# Patient Record
Sex: FEMALE | Race: WHITE | NOT HISPANIC OR LATINO
[De-identification: names, ages, dates, MRNs, and addresses within clinical notes are randomized per-mention and may not be internally consistent; named-entity substitution may affect disease eponyms.]

---

## 2017-11-27 ENCOUNTER — TRANSCRIPTION ENCOUNTER (OUTPATIENT)
Age: 32
End: 2017-11-27

## 2022-12-20 PROBLEM — Z00.00 ENCOUNTER FOR PREVENTIVE HEALTH EXAMINATION: Status: ACTIVE | Noted: 2022-12-20

## 2023-01-24 ENCOUNTER — APPOINTMENT (OUTPATIENT)
Dept: PLASTIC SURGERY | Facility: CLINIC | Age: 38
End: 2023-01-24
Payer: SELF-PAY

## 2023-01-24 VITALS — WEIGHT: 121.25 LBS | HEIGHT: 65.75 IN | BODY MASS INDEX: 19.72 KG/M2

## 2023-01-24 DIAGNOSIS — Z41.1 ENCOUNTER FOR COSMETIC SURGERY: ICD-10-CM

## 2023-01-24 DIAGNOSIS — Z78.9 OTHER SPECIFIED HEALTH STATUS: ICD-10-CM

## 2023-01-24 DIAGNOSIS — Z86.2 PERSONAL HISTORY OF DISEASES OF THE BLOOD AND BLOOD-FORMING ORGANS AND CERTAIN DISORDERS INVOLVING THE IMMUNE MECHANISM: ICD-10-CM

## 2023-01-24 PROCEDURE — 99499 UNLISTED E&M SERVICE: CPT

## 2023-01-24 NOTE — PHYSICAL EXAM
[de-identified] : SN-N: L 20 cm R 19 cm BD: L 12.5 cm R 11.5cm,  N-IMF on stretch: 7 cm b/l, nevus below IMF left LIQ, right breast smaller with glandular atrophy b/l, skin thin, would place implant subpec, no scars, no nipple drainage, no nipple retraction

## 2023-01-24 NOTE — ASSESSMENT
[FreeTextEntry1] : I reviewed with the patient the risks benefits and alternatives of breast augmentation with prosthetic implants. The goal of the operation is to create a more enlarged breast mound by placing the implant in either the subglandular or dual plane/sub muscular position. The access incision can either be IMF or periareolar. IMF incision has less disruption of the breast gland ducts and therefore theoretically less chance of bacterial contamination and biofilm creation which is thought to be associated with capsular contracture. Submuscular placement of the implant has less risk of visible or palpable implant in the upper pole as well as theoretical benefit of decreased capsule contracture, however, it is associated with muscle animation deformity and greater postop pain. We discussed the differences between saline implants and silicone implants and the FDA recommendation for MRI surveillance postoperatively to rule out ruptured silicone implants. The risks of the surgery include the risk implant related complications such as implant infection, implant rupture, capsule contracture, implant malposition, palpable and visible rippling and need for revision surgery as well as asymmetry of the breasts, loss of nipple sensation ( or hypersensitivity), loss of the ability to breast feed, need for specialized techniques during mammography for breast cancer screening and likelihood that she will need revision surgery in the future as the implants are not designed to last a lifetime. \par We discussed liposuction from abdomen/flanks for fat grafting and/or body contouring, advised to do this in the future if needed. \par Plan for silicone Implant: SRM - 250-325cc range, the right breast is smaller \par Showed her virtual images - [employer]\par She stated that we answered all of her questions.  \par \par I explained that the final size will be determined intra-operatively and that it will likely look a little smaller than it does simulating it with an implant under the bra.\par

## 2023-01-24 NOTE — HISTORY OF PRESENT ILLNESS
[FreeTextEntry1] : 38 y/o female presents for initial consultation for breast augmentation. Denies any family history of breast or ovarian cancer. Patient has not had mammograms/US before. Patient has 3 children, 2 c-sections on 10/11/2019 and 05/09/2022 and a natural birth. Patient is not planning on having anymore children.\par \par No family history of bleeding or clotting disorder. 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 She took steroids for a few months. VTE score = 2

## 2023-02-08 ENCOUNTER — APPOINTMENT (OUTPATIENT)
Dept: PLASTIC SURGERY | Facility: CLINIC | Age: 38
End: 2023-02-08
Payer: SELF-PAY

## 2023-02-08 PROCEDURE — 99499Q: CUSTOM | Mod: NC

## 2023-02-08 RX ORDER — IBUPROFEN 600 MG/1
600 TABLET, FILM COATED ORAL EVERY 6 HOURS
Qty: 24 | Refills: 0 | Status: ACTIVE | COMMUNITY
Start: 2023-02-08 | End: 1900-01-01

## 2023-02-08 RX ORDER — OXYCODONE 5 MG/1
5 TABLET ORAL
Qty: 12 | Refills: 0 | Status: ACTIVE | COMMUNITY
Start: 2023-02-08 | End: 1900-01-01

## 2023-02-10 NOTE — HISTORY OF PRESENT ILLNESS
[FreeTextEntry1] : Patient Name: MAURICE ARMENTA \par : 1985 \par Date: 2023 \par Attending: Dr. Oren Lerman\par \par HPI: preop consultation for bilateral breast augmentation with silicone implants on 2/15/23.\par \par Allergies: NKDA\par Medication prescribed: ibuprofen 600 mg, oxycodone 5 mg\par \par ROS: complete 14 point review of systems negative except pertinent items reviewed in the HPI. Other non-contributory items reviewed in our new patient questionnaire and we have submitted it to be scanned into the medical record. \par \par Physical Exam: \par completed at time of in office evaluation \par \par Assessment/Plan:\par We have discussed pre and postop instructions, recovery limitations, restrictions and expectations, ERAS protocol, NPO status, transportation home, postop medications, COVID-19 policies, benefits and risks of the procedure. Pre-op labs reviewed. The patient would like to proceed with surgery as scheduled.\par \par SANDRA MOSER NP\par \par

## 2023-02-14 ENCOUNTER — TRANSCRIPTION ENCOUNTER (OUTPATIENT)
Age: 38
End: 2023-02-14

## 2023-02-14 RX ORDER — CHLORHEXIDINE GLUCONATE 213 G/1000ML
1 SOLUTION TOPICAL DAILY
Refills: 0 | Status: DISCONTINUED | OUTPATIENT
Start: 2023-02-15 | End: 2023-02-15

## 2023-02-14 NOTE — ASU PATIENT PROFILE, ADULT - SITE
Cosmetic bilateral breast  augmentation  with silicone implants . laterality, bilateral breast implant

## 2023-02-14 NOTE — ASU PATIENT PROFILE, ADULT - NS PREOP UNDERSTANDS INFO
spoke to patient to be NPO/No solid food after 2300 pm tonight , allow to drink water till 2 am , dress comfortable , leave all valuable things at home , Bring ID photo, insurance and vaccination card,  escort arranged with an 18 years old  person to take you home , address and telephone given to patient./yes

## 2023-02-15 ENCOUNTER — OUTPATIENT (OUTPATIENT)
Dept: OUTPATIENT SERVICES | Facility: HOSPITAL | Age: 38
LOS: 1 days | Discharge: HOME | End: 2023-02-15
Payer: SELF-PAY

## 2023-02-15 ENCOUNTER — RESULT REVIEW (OUTPATIENT)
Age: 38
End: 2023-02-15

## 2023-02-15 ENCOUNTER — TRANSCRIPTION ENCOUNTER (OUTPATIENT)
Age: 38
End: 2023-02-15

## 2023-02-15 VITALS
HEIGHT: 65.75 IN | OXYGEN SATURATION: 100 % | HEART RATE: 84 BPM | SYSTOLIC BLOOD PRESSURE: 114 MMHG | DIASTOLIC BLOOD PRESSURE: 70 MMHG | TEMPERATURE: 98 F | RESPIRATION RATE: 17 BRPM | WEIGHT: 120.59 LBS

## 2023-02-15 VITALS
SYSTOLIC BLOOD PRESSURE: 107 MMHG | RESPIRATION RATE: 17 BRPM | DIASTOLIC BLOOD PRESSURE: 65 MMHG | TEMPERATURE: 97 F | OXYGEN SATURATION: 97 % | HEART RATE: 65 BPM

## 2023-02-15 DIAGNOSIS — Z98.891 HISTORY OF UTERINE SCAR FROM PREVIOUS SURGERY: Chronic | ICD-10-CM

## 2023-02-15 PROCEDURE — 88305 TISSUE EXAM BY PATHOLOGIST: CPT | Mod: 26

## 2023-02-15 PROCEDURE — 19325 BREAST AUGMENTATION W/IMPLT: CPT | Mod: 50

## 2023-02-15 DEVICE — IMPLANTABLE DEVICE: Type: IMPLANTABLE DEVICE | Status: FUNCTIONAL

## 2023-02-15 RX ORDER — FENTANYL CITRATE 50 UG/ML
25 INJECTION INTRAVENOUS
Refills: 0 | Status: DISCONTINUED | OUTPATIENT
Start: 2023-02-15 | End: 2023-02-15

## 2023-02-15 RX ORDER — APREPITANT 80 MG/1
40 CAPSULE ORAL ONCE
Refills: 0 | Status: COMPLETED | OUTPATIENT
Start: 2023-02-15 | End: 2023-02-15

## 2023-02-15 RX ORDER — ACETAMINOPHEN 500 MG
1000 TABLET ORAL ONCE
Refills: 0 | Status: COMPLETED | OUTPATIENT
Start: 2023-02-15 | End: 2023-02-15

## 2023-02-15 RX ORDER — SODIUM CHLORIDE 9 MG/ML
1000 INJECTION, SOLUTION INTRAVENOUS
Refills: 0 | Status: DISCONTINUED | OUTPATIENT
Start: 2023-02-15 | End: 2023-02-15

## 2023-02-15 RX ADMIN — Medication 1000 MILLIGRAM(S): at 09:45

## 2023-02-15 RX ADMIN — APREPITANT 40 MILLIGRAM(S): 80 CAPSULE ORAL at 09:46

## 2023-02-15 RX ADMIN — CHLORHEXIDINE GLUCONATE 1 APPLICATION(S): 213 SOLUTION TOPICAL at 09:46

## 2023-02-15 NOTE — ASU DISCHARGE PLAN (ADULT/PEDIATRIC) - PATIENT EDUCATION MATERIALS PROVIED
MD OFFICE/Provider pre-printed instructions given/Pre-printed instructions given for other (specify)

## 2023-02-15 NOTE — ASU DISCHARGE PLAN (ADULT/PEDIATRIC) - CARE PROVIDER_API CALL
Lerman, Oren Z (MD)  Plastic Surgery  [facility] [place] [address] [place], [place] [address]  Phone: [phone]  Fax: [phone]  Follow Up Time: Routine

## 2023-02-21 ENCOUNTER — APPOINTMENT (OUTPATIENT)
Dept: PLASTIC SURGERY | Facility: CLINIC | Age: 38
End: 2023-02-21
Payer: SELF-PAY

## 2023-02-22 LAB — SURGICAL PATHOLOGY STUDY: SIGNIFICANT CHANGE UP

## 2023-02-22 NOTE — HISTORY OF PRESENT ILLNESS
[FreeTextEntry1] : 39 y/o female presents 13 days s/p bilateral breast augmentation with silicone implants. ?Denies any f/c/n/v.

## 2023-02-24 ENCOUNTER — APPOINTMENT (OUTPATIENT)
Dept: PLASTIC SURGERY | Facility: CLINIC | Age: 38
End: 2023-02-24
Payer: SELF-PAY

## 2023-02-24 DIAGNOSIS — Z41.1 ENCOUNTER FOR COSMETIC SURGERY: ICD-10-CM

## 2023-02-24 PROCEDURE — 99024 POSTOP FOLLOW-UP VISIT: CPT

## 2023-02-24 NOTE — PHYSICAL EXAM
[de-identified] : bilateral implants in place, Incisions c/d/i, no collections or s/sx of infection, resolving ecchymosis, suture removed, steris in place\par

## 2023-02-24 NOTE — ASSESSMENT
[FreeTextEntry1] : Healing well\par PO instructions:\par allow steris to fall off, then aquaphor x 2 weeks\par then silicone scar gel/strips\par activity restrictions reviewed\par ok to sleep on back\par massage\par sports bra x 4  weeks, no underwire\par 4 weeks pilates, 6 weeks running\par \par RTC in 1 month

## 2023-02-24 NOTE — HISTORY OF PRESENT ILLNESS
[FreeTextEntry1] : 38 year old female 1.5 weeks PO from [facility] cosmetic breast augmentation with silicon Implants. Doing well at home, No complaints, No f/c/n/v/d. taking OTC ibuprofen and tylenol for pain.

## 2023-02-28 ENCOUNTER — APPOINTMENT (OUTPATIENT)
Dept: PLASTIC SURGERY | Facility: CLINIC | Age: 38
End: 2023-02-28
Payer: SELF-PAY

## 2025-01-01 ENCOUNTER — NON-APPOINTMENT (OUTPATIENT)
Age: 40
End: 2025-01-01

## 2025-01-21 ENCOUNTER — APPOINTMENT (OUTPATIENT)
Dept: INTERNAL MEDICINE | Facility: CLINIC | Age: 40
End: 2025-01-21

## 2025-01-22 ENCOUNTER — APPOINTMENT (OUTPATIENT)
Dept: INTERNAL MEDICINE | Facility: CLINIC | Age: 40
End: 2025-01-22

## 2025-05-06 ENCOUNTER — APPOINTMENT (OUTPATIENT)
Dept: INTERNAL MEDICINE | Facility: CLINIC | Age: 40
End: 2025-05-06

## (undated) DEVICE — DRSG SURGICAL BRA 2XL 42-44

## (undated) DEVICE — ELCTR BOVIE TIP BLADE INSULATED 2.75" EDGE

## (undated) DEVICE — SYR LUER LOK 50CC

## (undated) DEVICE — DRSG DERMABOND PRINEO 60CM

## (undated) DEVICE — DRSG SURGICAL BRA MED 36-38

## (undated) DEVICE — DRSG SURGICAL BRA XL 40-42

## (undated) DEVICE — SLV COMPRESSION KNEE MED

## (undated) DEVICE — GLV 7.5 PROTEXIS (WHITE)

## (undated) DEVICE — DRSG SURGICAL BRA SM 34-36

## (undated) DEVICE — PACK BREAST RECONSTRUCTION

## (undated) DEVICE — SUT PLAIN GUT FAST ABSORBING 5-0 PC-1

## (undated) DEVICE — ONETRAC LIGHTED RETRACTOR 135 X 30MM DISP

## (undated) DEVICE — DRSG TEGADERM 3.5X6"

## (undated) DEVICE — WARMING BLANKET LOWER ADULT

## (undated) DEVICE — SUT VICRYL 2-0 27" CT-1 UNDYED

## (undated) DEVICE — PREP ALCOHOL PAD MED

## (undated) DEVICE — DRAPE MEDIUM SHEET 44" X 70"

## (undated) DEVICE — WARMING BLANKET FULL UNDERBODY

## (undated) DEVICE — NDL HYPO SAFE 18G X 1.5" (PINK)

## (undated) DEVICE — SUT VICRYL 3-0 18" SH UNDYED (POP-OFF)

## (undated) DEVICE — MARKING PEN W RULER

## (undated) DEVICE — SUT MONOCRYL 4-0 18" P-3 UNDYED

## (undated) DEVICE — POSITIONER FOAM EGG CRATE ULNAR 2PCS (PINK)

## (undated) DEVICE — SYR LUER LOK 10CC

## (undated) DEVICE — DRSG KERLIX ROLL 4.5"

## (undated) DEVICE — KELLER FUNNEL

## (undated) DEVICE — SYR ASEPTO

## (undated) DEVICE — DRSG SURGICAL BRA 3XL 44-46

## (undated) DEVICE — NDL HYPO REGULAR BEVEL 25G X 1.5" (BLUE)

## (undated) DEVICE — SUT PDS II 2-0 27" CT-1

## (undated) DEVICE — PREP BETADINE SPONGE STICKS

## (undated) DEVICE — FUNNEL STRL 6 IND PK

## (undated) DEVICE — DRSG MASTISOL

## (undated) DEVICE — SUT MONOCRYL 3-0 18" PS-2 UNDYED

## (undated) DEVICE — BAG DECANTER IV STERILE

## (undated) DEVICE — DRSG SURGICAL BRA LG 38-40